# Patient Record
Sex: MALE | Race: WHITE | Employment: OTHER | URBAN - METROPOLITAN AREA
[De-identification: names, ages, dates, MRNs, and addresses within clinical notes are randomized per-mention and may not be internally consistent; named-entity substitution may affect disease eponyms.]

---

## 2017-10-16 ENCOUNTER — HOSPITAL ENCOUNTER (OUTPATIENT)
Dept: PULMONOLOGY | Age: 67
Discharge: HOME | End: 2017-10-16
Attending: INTERNAL MEDICINE | Admitting: INTERNAL MEDICINE

## 2017-10-16 DIAGNOSIS — Z87.891 PERSONAL HISTORY OF NICOTINE DEPENDENCE: ICD-10-CM

## 2017-10-16 DIAGNOSIS — Z87.891 PERSONAL HISTORY OF TOBACCO USE: ICD-10-CM

## 2017-10-16 RX ORDER — ALBUTEROL SULFATE 90 UG/1
4 AEROSOL, METERED RESPIRATORY (INHALATION) ONCE
Status: COMPLETED | OUTPATIENT
Start: 2017-10-16 | End: 2017-10-16

## 2017-10-16 RX ADMIN — ALBUTEROL SULFATE 4 PUFF: 90 AEROSOL, METERED RESPIRATORY (INHALATION) at 18:08

## 2017-10-17 NOTE — PROGRESS NOTES
Pulmonary Function Testing      Patient name:  Ismael Vneegas     [facility] Unit #:   [id number]   Date of test:  10/16/2017  Date of interpretation:   10/17/2017    Mr. Ismael Venegas is a 75y.o. year-old former smoker. The spirometry data were acceptable and reproducible. Spirometry:  Flow volume loops were obstructed. The FEV-1/FVC ratio was decreased. The FEV-1 was 2.05 liters (70% of predicted), which was moderately decreased. The FVC was 3.38 liters (85% of predicted), which was decreased. Response to inhaled bronchodilators (albuterol) was not significant. Lung volumes:  Lung volumes were tested by plethysmography. The total lung capacity was 6.28 liters (108% of predicted), which was normal. The residual volume was 2.64 liters (115% of predicted), which was normal. The ratio of residual volume to total lung capacity (RV/TLC) was 42, which was normal.     Diffusion capacity was found to be normal.       Interpretation:  Moderate obstructive airway disease. No significant change noted based on study done in sep 2015.     Comments:

## 2017-10-20 ENCOUNTER — TELEPHONE (OUTPATIENT)
Dept: PULMONOLOGY | Age: 67
End: 2017-10-20

## 2017-10-20 DIAGNOSIS — Z87.891 PERSONAL HISTORY OF TOBACCO USE: Primary | ICD-10-CM

## 2017-10-20 NOTE — PATIENT INSTRUCTIONS
What is lung cancer screening? Lung cancer screening is a way in which doctors check the lungs for early signs of cancer in people who have no symptoms of lung cancer. A low-dose CT scan uses much less radiation than a normal CT scan and shows a more detailed image of the lungs than a standard X-ray. The goal of lung cancer screening is to find cancer early, before it has a chance to grow, spread, or cause problems. One large study found that smokers who were screened with low-dose CT scans were less likely to die of lung cancer than those who were screened with standard X-ray. Below is a summary of the things you need to know regarding screening for lung cancer with low-dose computed tomography (LDCT). This is a screening program that involves routine annual screening with LDCT studies of the lung. The LDCTs are done using low-dose radiation that is not thought to increase your cancer risk. If you have other serious medical conditions (other cancers, congestive heart failure) that limit your life expectancy to less than 10 years, you should not undergo lung cancer screening with LDCT. The chance is 20%-60% that the LDCT result will show abnormalities. This would require additional testing which could include repeat imaging or even invasive procedures. Most (about 95%) of \"abnormal\" LDCT results are false in the sense that no lung cancer is ultimately found. Additionally, some (about 10%) of the cancers found would not affect your life expectancy, even if undetected and untreated. If you are still smoking, the single most important thing that you can do to reduce your risk of dying of lung cancer is to quit. For this screening to be covered by Medicare and most other insurers, strict criteria must be met. If you do not meet these criteria, but still wish to undergo LDCT testing, you will be required to sign a waiver indicating your willingness to pay for the scan.

## 2017-10-25 ENCOUNTER — TELEPHONE (OUTPATIENT)
Dept: PULMONOLOGY | Age: 67
End: 2017-10-25

## 2017-10-25 NOTE — TELEPHONE ENCOUNTER
He called today and stated that Mick wants to come  his equiptment. He asked if we could send a script to the [place] so they can take over paying for it. Can we please fax to [phone].

## 2017-10-26 ENCOUNTER — TELEPHONE (OUTPATIENT)
Dept: CASE MANAGEMENT | Age: 67
End: 2017-10-26

## 2017-11-17 ENCOUNTER — OFFICE VISIT (OUTPATIENT)
Dept: PULMONOLOGY | Age: 67
End: 2017-11-17

## 2017-11-17 VITALS
OXYGEN SATURATION: 94 % | SYSTOLIC BLOOD PRESSURE: 136 MMHG | HEART RATE: 94 BPM | WEIGHT: 239 LBS | BODY MASS INDEX: 37.51 KG/M2 | DIASTOLIC BLOOD PRESSURE: 79 MMHG | RESPIRATION RATE: 18 BRPM | HEIGHT: 67 IN

## 2017-11-17 DIAGNOSIS — J44.9 MODERATE COPD (CHRONIC OBSTRUCTIVE PULMONARY DISEASE) (HCC): Primary | ICD-10-CM

## 2017-11-17 DIAGNOSIS — J96.11 CHRONIC RESPIRATORY FAILURE WITH HYPOXIA (HCC): ICD-10-CM

## 2017-11-17 DIAGNOSIS — G47.33 OSA (OBSTRUCTIVE SLEEP APNEA): ICD-10-CM

## 2017-11-17 PROCEDURE — 3023F SPIROM DOC REV: CPT | Performed by: INTERNAL MEDICINE

## 2017-11-17 PROCEDURE — 99213 OFFICE O/P EST LOW 20 MIN: CPT | Performed by: INTERNAL MEDICINE

## 2017-11-17 PROCEDURE — G8427 DOCREV CUR MEDS BY ELIG CLIN: HCPCS | Performed by: INTERNAL MEDICINE

## 2017-11-17 PROCEDURE — G8484 FLU IMMUNIZE NO ADMIN: HCPCS | Performed by: INTERNAL MEDICINE

## 2017-11-17 PROCEDURE — 4040F PNEUMOC VAC/ADMIN/RCVD: CPT | Performed by: INTERNAL MEDICINE

## 2017-11-17 PROCEDURE — G8419 CALC BMI OUT NRM PARAM NOF/U: HCPCS | Performed by: INTERNAL MEDICINE

## 2017-11-17 PROCEDURE — 3017F COLORECTAL CA SCREEN DOC REV: CPT | Performed by: INTERNAL MEDICINE

## 2017-11-17 PROCEDURE — 1123F ACP DISCUSS/DSCN MKR DOCD: CPT | Performed by: INTERNAL MEDICINE

## 2017-11-17 PROCEDURE — G8926 SPIRO NO PERF OR DOC: HCPCS | Performed by: INTERNAL MEDICINE

## 2017-11-17 PROCEDURE — 1036F TOBACCO NON-USER: CPT | Performed by: INTERNAL MEDICINE

## 2017-11-17 NOTE — PROGRESS NOTES
[facility] Pulmonary and Critical Care    Outpatient Follow Up Note    Subjective:   CHIEF COMPLAINT / HPI:  O2 evaluation, COPD, DEAN  Chief Complaint   Patient presents with    COPD     1 yr follow up - pt states that he gets sob with exertion     Shortness of Breath     oxygen sat today 94% sitting on room air / 88% walking on room air / 95% walking on 2 L       The patient is 75 y.o. male who presents today for a follow-up visit related to the above mentioned issues. He denies any worsening SOB, cough or chest pain    He was seen initially during hospital stay for hypercapnic and hypoxic respiratory failure and renal failure. He has a CPAP at home (through the [employer] system) and has been using it more often. He is a longtime smoker but quitted 2010   He denies any exacerbation or hospitalization due to respiratory illness since last visit    His PFT showed moderate obstructive changes  He is on Symbicort 160 and Spiriva daily with Albuterol PRN    Past Medical History:   Diagnosis Date    Former smoker     50 pack-years      Social History:    History   Smoking Status    Former Smoker    Packs/day: 1.00    Years: 45.00    Types: Cigarettes    Quit date: 1/1/2010   Smokeless Tobacco    Never Used     Current Medications:  Current Outpatient Prescriptions on File Prior to Visit   Medication Sig Dispense Refill    budesonide-formoterol (SYMBICORT) 160-4.5 MCG/ACT AERO Inhale 2 puffs into the lungs 2 times daily      Tiotropium Bromide Monohydrate (SPIRIVA RESPIMAT) 2.5 MCG/ACT AERS Inhale 2 puffs into the lungs daily 1 Inhaler 6    albuterol (PROAIR HFA) 108 (90 BASE) MCG/ACT inhaler Inhale 2 puffs into the lungs every 6 hours as needed for Wheezing Proventil or Ventolin 1 Inhaler 6    lidocaine (LIDODERM) 5 % Place 2 patches onto the skin daily 12 hours on, 12 hours off.  30 patch 0    vitamin D (CHOLECALCIFEROL) 400 UNITS TABS tablet Take 1 tablet by mouth daily 30 tablet 3    glucose monitoring kit (FREESTYLE) monitoring kit 1 kit by Does not apply route daily as needed 1 kit 0    [employer] LANCETS MISC 1 each by Does not apply route daily 100 each 3    glucose blood VI test strips (ACCU-CHEK ACTIVE STRIPS) strip 1 each by In Vitro route daily As needed. 100 each 3    aspirin 81 MG chewable tablet Take 81 mg by mouth daily      cyclobenzaprine (FLEXERIL) 10 MG tablet Take 10 mg by mouth 2 times daily as needed for Muscle spasms      gabapentin (NEURONTIN) 300 MG capsule Take 300 mg by mouth 2 times daily      glipiZIDE (GLUCOTROL) 5 MG tablet Take 5 mg by mouth 2 times daily (before meals)      hydrOXYzine (ATARAX) 10 MG tablet Take 10 mg by mouth 3 times daily as needed for Itching      levothyroxine (SYNTHROID) 50 MCG tablet Take 50 mcg by mouth Daily      metFORMIN (GLUCOPHAGE) 500 MG tablet Take 500 mg by mouth 2 times daily (with meals)      metoprolol (LOPRESSOR) 25 MG tablet Take 25 mg by mouth 2 times daily      omeprazole (PRILOSEC) 20 MG capsule Take 20 mg by mouth daily      sertraline (ZOLOFT) 50 MG tablet Take 50 mg by mouth daily      simvastatin (ZOCOR) 40 MG tablet Take 40 mg by mouth nightly       No current facility-administered medications on file prior to visit.       REVIEW OF SYSTEMS:    CONSTITUTIONAL: Negative for fevers and chills  HEENT: Negative for oropharyngeal exudate, post nasal drip, sinus pain / pressure, nasal congestion, ear pain  RESPIRATORY:  See HPI  CARDIOVASCULAR: Negative for chest pain, palpitations, edema  GASTROINTESTINAL: Negative for nausea, vomiting, diarrhea, constipation and abdominal pain  HEMATOLOGICAL: Negative for adenopathy  SKIN: Negative for clubbing, cyanosis, skin lesions   EXTREMITIES: Negative for weakness, decreased ROM  NEUROLOGICAL: Negative for unilateral weakness, speech or gait abnormalities    Objective:   PHYSICAL EXAM:        VITALS:    /79   Pulse 94   Resp 18   Ht 5' 7\" (1.702 m)   Wt 239 lb (108.4 kg)   SpO2 94% sleep apnea)     3. Chronic respiratory failure with hypoxia (HCC)       Plan:   1. Continue O2 as directed    oxygen sat today 94% sitting on room air / 88% walking on room air / 95% walking on 2 L     2. Continue Symbicort 160 BID, Spiriva daily and Albuterol PRN  3. Continue CPAP and F/U with sleep clinic at [facility]  4. Recommended annual CT chest screen   5. Refer to pulmonary rehab  6.  RTC in 6-12 months

## 2017-11-17 NOTE — COMMUNICATION BODY
Assessment:     1. Moderate COPD (chronic obstructive pulmonary disease) ([facility] [address] [address]     2. DEAN (obstructive sleep apnea)     3. Chronic respiratory failure with hypoxia (HCC)       Plan:   1. Continue O2 as directed    oxygen sat today 94% sitting on room air / 88% walking on room air / 95% walking on 2 L     2. Continue Symbicort 160 BID, Spiriva daily and Albuterol PRN  3. Continue CPAP and F/U with sleep clinic at [address] [place] [address]  4. Recommended annual CT chest screen   5. Refer to pulmonary rehab  6.  RTC in 6-12 months

## 2017-11-17 NOTE — LETTER
Pulmonary, Critical Care & Sleep  [address] [place] [address], [address] [facility] [address]  Phone: [phone]  Fax: [phone]      November 17, 2017       Patient: Ho Claire   MR Number: [id number]   YOB: 1950   Date of Visit: 11/17/2017       Dear Dr. Radha Mcneal:    I saw Mr. Don Henderson today for F/U visit. Below are the relevant portions of my assessment and plan of care. Assessment:     1. Moderate COPD (chronic obstructive pulmonary disease) ([address]     2. DEAN (obstructive sleep apnea)     3. Chronic respiratory failure with hypoxia (HCC)         Plan:   1. Continue O2 as directed    oxygen sat today 94% sitting on room air / 88% walking on room air / 95% walking on 2 L     2. Continue Symbicort 160 BID, Spiriva daily and Albuterol PRN  3. Continue CPAP and F/U with sleep clinic at [facility]  4. Recommended annual CT chest screen   5. Refer to pulmonary rehab  6. RTC in 6-12 months     If you have questions, please do not hesitate to call me. I look forward to following Alonzo Sampson along with you. Sincerely,        Richard Kennedy MD    CC providers:   MD Marycarmen Salgado [phone]  [address]: [phone]

## 2018-01-04 ENCOUNTER — HOSPITAL ENCOUNTER (OUTPATIENT)
Dept: CARDIAC REHAB | Age: 68
Discharge: HOME | End: 2018-01-31
Attending: INTERNAL MEDICINE | Admitting: INTERNAL MEDICINE

## 2018-02-01 ENCOUNTER — HOSPITAL ENCOUNTER (OUTPATIENT)
Dept: OTHER | Age: 68
Discharge: HOME | End: 2018-02-28
Attending: INTERNAL MEDICINE | Admitting: INTERNAL MEDICINE

## 2018-03-01 ENCOUNTER — HOSPITAL ENCOUNTER (OUTPATIENT)
Dept: OTHER | Age: 68
Discharge: HOME | End: 2018-03-31
Attending: INTERNAL MEDICINE | Admitting: INTERNAL MEDICINE

## 2018-04-01 ENCOUNTER — HOSPITAL ENCOUNTER (OUTPATIENT)
Dept: OTHER | Age: 68
Discharge: HOME | End: 2018-04-30
Attending: INTERNAL MEDICINE | Admitting: INTERNAL MEDICINE

## 2018-10-03 ENCOUNTER — HOSPITAL ENCOUNTER (EMERGENCY)
Age: 68
Discharge: HOME | End: 2018-10-03
Attending: EMERGENCY MEDICINE
Payer: MEDICARE

## 2018-10-03 ENCOUNTER — APPOINTMENT (OUTPATIENT)
Dept: GENERAL RADIOLOGY | Age: 68
End: 2018-10-03
Payer: MEDICARE

## 2018-10-03 VITALS
SYSTOLIC BLOOD PRESSURE: 146 MMHG | WEIGHT: 250 LBS | OXYGEN SATURATION: 93 % | TEMPERATURE: 97.6 F | DIASTOLIC BLOOD PRESSURE: 80 MMHG | HEIGHT: 67 IN | RESPIRATION RATE: 16 BRPM | HEART RATE: 88 BPM | BODY MASS INDEX: 39.24 KG/M2

## 2018-10-03 DIAGNOSIS — R06.00 DYSPNEA, UNSPECIFIED TYPE: ICD-10-CM

## 2018-10-03 DIAGNOSIS — E87.70 HYPERVOLEMIA, UNSPECIFIED HYPERVOLEMIA TYPE: ICD-10-CM

## 2018-10-03 DIAGNOSIS — I50.9 ACUTE ON CHRONIC CONGESTIVE HEART FAILURE, UNSPECIFIED HEART FAILURE TYPE (HCC): ICD-10-CM

## 2018-10-03 DIAGNOSIS — J44.1 COPD EXACERBATION (HCC): Primary | ICD-10-CM

## 2018-10-03 LAB
A/G RATIO: 1.3 (ref 1.1–2.2)
ALBUMIN SERPL-MCNC: 4 G/DL (ref 3.4–5)
ALP BLD-CCNC: 104 U/L (ref 40–129)
ALT SERPL-CCNC: 32 U/L (ref 10–40)
ANION GAP SERPL CALCULATED.3IONS-SCNC: 12 MMOL/L (ref 3–16)
APTT: 31.9 SEC (ref 26–36)
AST SERPL-CCNC: 32 U/L (ref 15–37)
BASOPHILS ABSOLUTE: 0 K/UL (ref 0–0.2)
BASOPHILS RELATIVE PERCENT: 0.6 %
BILIRUB SERPL-MCNC: 0.3 MG/DL (ref 0–1)
BUN BLDV-MCNC: 20 MG/DL (ref 7–20)
CALCIUM SERPL-MCNC: 9.6 MG/DL (ref 8.3–10.6)
CHLORIDE BLD-SCNC: 102 MMOL/L (ref 99–110)
CO2: 27 MMOL/L (ref 21–32)
CREAT SERPL-MCNC: 1.1 MG/DL (ref 0.8–1.3)
EOSINOPHILS ABSOLUTE: 0.1 K/UL (ref 0–0.6)
EOSINOPHILS RELATIVE PERCENT: 1.8 %
GFR AFRICAN AMERICAN: >60
GFR NON-AFRICAN AMERICAN: >60
GLOBULIN: 3.2 G/DL
GLUCOSE BLD-MCNC: 262 MG/DL (ref 70–99)
HCT VFR BLD CALC: 36.9 % (ref 40.5–52.5)
HEMOGLOBIN: 12.1 G/DL (ref 13.5–17.5)
INR BLD: 1.04 (ref 0.86–1.14)
LYMPHOCYTES ABSOLUTE: 1.2 K/UL (ref 1–5.1)
LYMPHOCYTES RELATIVE PERCENT: 18.2 %
MCH RBC QN AUTO: 28.9 PG (ref 26–34)
MCHC RBC AUTO-ENTMCNC: 32.9 G/DL (ref 31–36)
MCV RBC AUTO: 88.1 FL (ref 80–100)
MONOCYTES ABSOLUTE: 0.3 K/UL (ref 0–1.3)
MONOCYTES RELATIVE PERCENT: 5.4 %
NEUTROPHILS ABSOLUTE: 4.7 K/UL (ref 1.7–7.7)
NEUTROPHILS RELATIVE PERCENT: 74 %
PDW BLD-RTO: 14.7 % (ref 12.4–15.4)
PLATELET # BLD: 140 K/UL (ref 135–450)
PMV BLD AUTO: 9.4 FL (ref 5–10.5)
POTASSIUM REFLEX MAGNESIUM: 4.3 MMOL/L (ref 3.5–5.1)
PRO-BNP: 49 PG/ML (ref 0–124)
PROTHROMBIN TIME: 11.8 SEC (ref 9.8–13)
RBC # BLD: 4.19 M/UL (ref 4.2–5.9)
SODIUM BLD-SCNC: 141 MMOL/L (ref 136–145)
TOTAL PROTEIN: 7.2 G/DL (ref 6.4–8.2)
TROPONIN: <0.01 NG/ML
WBC # BLD: 6.4 K/UL (ref 4–11)

## 2018-10-03 PROCEDURE — 36415 COLL VENOUS BLD VENIPUNCTURE: CPT

## 2018-10-03 PROCEDURE — 83880 ASSAY OF NATRIURETIC PEPTIDE: CPT

## 2018-10-03 PROCEDURE — 71046 X-RAY EXAM CHEST 2 VIEWS: CPT

## 2018-10-03 PROCEDURE — 93010 ELECTROCARDIOGRAM REPORT: CPT | Performed by: INTERNAL MEDICINE

## 2018-10-03 PROCEDURE — 94760 N-INVAS EAR/PLS OXIMETRY 1: CPT

## 2018-10-03 PROCEDURE — 85610 PROTHROMBIN TIME: CPT

## 2018-10-03 PROCEDURE — 93005 ELECTROCARDIOGRAM TRACING: CPT | Performed by: PHYSICIAN ASSISTANT

## 2018-10-03 PROCEDURE — 80053 COMPREHEN METABOLIC PANEL: CPT

## 2018-10-03 PROCEDURE — 94664 DEMO&/EVAL PT USE INHALER: CPT

## 2018-10-03 PROCEDURE — 94640 AIRWAY INHALATION TREATMENT: CPT

## 2018-10-03 PROCEDURE — 96374 THER/PROPH/DIAG INJ IV PUSH: CPT

## 2018-10-03 PROCEDURE — 85025 COMPLETE CBC W/AUTO DIFF WBC: CPT

## 2018-10-03 PROCEDURE — 6370000000 HC RX 637 (ALT 250 FOR IP): Performed by: PHYSICIAN ASSISTANT

## 2018-10-03 PROCEDURE — 84484 ASSAY OF TROPONIN QUANT: CPT

## 2018-10-03 PROCEDURE — 85730 THROMBOPLASTIN TIME PARTIAL: CPT

## 2018-10-03 PROCEDURE — 99285 EMERGENCY DEPT VISIT HI MDM: CPT

## 2018-10-03 PROCEDURE — 6360000002 HC RX W HCPCS: Performed by: PHYSICIAN ASSISTANT

## 2018-10-03 RX ORDER — IPRATROPIUM BROMIDE AND ALBUTEROL SULFATE 2.5; .5 MG/3ML; MG/3ML
1 SOLUTION RESPIRATORY (INHALATION) ONCE
Status: COMPLETED | OUTPATIENT
Start: 2018-10-03 | End: 2018-10-03

## 2018-10-03 RX ORDER — FUROSEMIDE 10 MG/ML
40 INJECTION INTRAMUSCULAR; INTRAVENOUS ONCE
Status: COMPLETED | OUTPATIENT
Start: 2018-10-03 | End: 2018-10-03

## 2018-10-03 RX ADMIN — FUROSEMIDE 40 MG: 10 INJECTION, SOLUTION INTRAMUSCULAR; INTRAVENOUS at 16:38

## 2018-10-03 RX ADMIN — IPRATROPIUM BROMIDE AND ALBUTEROL SULFATE 1 AMPULE: .5; 3 SOLUTION RESPIRATORY (INHALATION) at 16:42

## 2018-10-03 ASSESSMENT — ENCOUNTER SYMPTOMS
RHINORRHEA: 0
NAUSEA: 0
DIARRHEA: 0
ABDOMINAL PAIN: 0
SHORTNESS OF BREATH: 1
COUGH: 0
VOMITING: 0

## 2018-10-03 NOTE — ED PROVIDER NOTES
1/1/2010    Smokeless tobacco: Never Used    Alcohol use 0.0 oz/week      Comment: rare    Drug use: No    Sexual activity: Not Asked     Other Topics Concern    None     Social History Narrative    None       SCREENINGS             PHYSICAL EXAM    (up to 7 for level 4, 8 or more for level 5)     ED Triage Vitals [10/03/18 1230]   BP Temp Temp Source Pulse Resp SpO2 Height Weight   (!) 146/80 97.6 °F (36.4 °C) Infrared 88 15 93 % 5' 7\" (1.702 m) 250 lb (113.4 kg)       Physical Exam   Constitutional: He is oriented to person, place, and time. He appears well-developed and well-nourished. HENT:   Head: Normocephalic and atraumatic. Right Ear: External ear normal.   Left Ear: External ear normal.   Nose: Nose normal.   Eyes: Right eye exhibits no discharge. Left eye exhibits no discharge. Neck: Normal range of motion. Neck supple. No tracheal deviation present. Cardiovascular: Normal rate, regular rhythm and normal heart sounds. No murmur heard. 3+ pitting edema to bilateral lower legs   Pulmonary/Chest: Effort normal. No respiratory distress. He has no rales. Diminished aeration with wheezing throughout   Abdominal: Soft. Bowel sounds are normal. He exhibits no distension. There is no tenderness. There is no rebound and no guarding. Musculoskeletal: Normal range of motion. Neurological: He is alert and oriented to person, place, and time. Skin: Skin is warm and dry. He is not diaphoretic. Psychiatric: He has a normal mood and affect. His behavior is normal.   Nursing note and vitals reviewed.       DIAGNOSTIC RESULTS   LABS:    Labs Reviewed   CBC WITH AUTO DIFFERENTIAL - Abnormal; Notable for the following:        Result Value    RBC 4.19 (*)     Hemoglobin 12.1 (*)     Hematocrit 36.9 (*)     All other components within normal limits    Narrative:     Performed at:  [facility] [address], [place], [address]   Phone [phone] METABOLIC PANEL W/ REFLEX TO MG FOR LOW K - Abnormal; Notable for the following:     Glucose 262 (*)     All other components within normal limits    Narrative:     Performed at:  [facility] [address], [facility], [address] [facility]   Phone [phone]   TROPONIN    Narrative:     Performed at:  [facility] [address], [facility], [address] [place]   Phone [phone]     Narrative:     Performed at:  [facility] [address], [facility], [address] [place] [address] [place]   Phone [phone]   PROTIME-INR    Narrative:     Performed at:  [facility] [address], [facility], [facility]   Phone [phone]   APTT    Narrative:     Performed at:  [facility] [address], [facility], [facility] [employer]   Phone [phone]       All other labs were within normal range or not returned as of this dictation. EKG: All EKG's are interpreted by the Emergency Department Physician who either signs or Co-signs this chart in the absence of a cardiologist.  Please see their note for interpretation of EKG. RADIOLOGY:   Non-plain film images such as CT, Ultrasound and MRI are read by the radiologist. Plain radiographic images are visualized and preliminarily interpreted by the  ED Provider with the below findings:        Interpretation per the Radiologist below, if available at the time of this note:    XR CHEST STANDARD (2 VW)   Final Result   No evidence of acute cardiopulmonary disease.            Xr Chest Standard (2 Vw)    Result Date: 10/3/2018  EXAMINATION: TWO VIEWS OF THE CHEST 10/3/2018 12:54 pm COMPARISON: August 1, 2018 HISTORY: ORDERING SYSTEM PROVIDED HISTORY: sob - pedal edema TECHNOLOGIST PROVIDED HISTORY: Reason for exam:->sob - pedal edema Ordering Physician Provided Reason for Exam: Shortness of Breath (PT to er reporting edema in his

## 2018-10-03 NOTE — ED PROVIDER NOTES
I  cared for and evaluated the patient in conjunction with the ED Advanced Practice Provider    [address] [place] [address]  Jeannette Galvan is a 70 y.o. male presents emergency room chief complaint shortness of breath. He had increased swelling in his legs. His feet and leg swelling is present approximately 13 pound weight gain over the past 3 months. He's had a decrease in his Lasix by his cardiologist from 60-40 mg. Since losing her back up to the 60 mg. He denies any nausea no vomiting no chest pain. Does have a history of COPD. He is his home inhalers regularly. Denies any fevers denies any chills  Denies any dizzy spells. He does have an appointment to see his specialist in the coming weeks  . PHYSICAL EXAM  BP (!) 146/80   Pulse 88   Temp 97.6 °F (36.4 °C) (Infrared)   Resp 16   Ht 5' 7\" (1.702 m)   Wt 250 lb (113.4 kg)   SpO2 93%   BMI 39.16 kg/m²     On exam, the patient appears in no acute distress. Speech is clear. Breathing is unlabored. Moves all extremities  Heart: Regular rate and rhythm no murmurs gallops or clicks  Lungs: Clear to auscultation no wheezes rales or rhonchi  Abdomen is soft nontender nondistended normal bowel sounds    EKGs performed that shows normal sinus rhythm nonspecific ST-T changes are noted with flattening in 3 rate 80 16256 QRS 74 QTc 46 QTc 485 compared to prior EKG August 2000 8:15   All diagnostic, treatment, and disposition decisions were made by myself in conjunction with the advanced practice provider. IV access established but his [place] for analysis. The patient started on medications for symptomatic control. There is no evidence of fluid overload and he chest x-ray without on clinical exam he has pitting edema to his legs. .  At this time he is otherwise stable I feel that he can attempt home treatment for symptoms with increasing Lasix back to her previous dosing is otherwise agreement this plan is able to ambulate about the emergency punctuation, and spelling, as well as words and phrases that may be inappropriate. If there are any questions or concerns please feel free to contact the dictating provider for clarification.       Brock Grant DO  10/03/18 4296

## 2018-10-09 LAB
EKG ATRIAL RATE: 86 BPM
EKG DIAGNOSIS: NORMAL
EKG P AXIS: 56 DEGREES
EKG P-R INTERVAL: 140 MS
EKG Q-T INTERVAL: 406 MS
EKG QRS DURATION: 84 MS
EKG QTC CALCULATION (BAZETT): 485 MS
EKG R AXIS: 6 DEGREES
EKG T AXIS: 24 DEGREES
EKG VENTRICULAR RATE: 86 BPM